# Patient Record
Sex: FEMALE | Race: WHITE | Employment: FULL TIME | URBAN - METROPOLITAN AREA
[De-identification: names, ages, dates, MRNs, and addresses within clinical notes are randomized per-mention and may not be internally consistent; named-entity substitution may affect disease eponyms.]

---

## 2019-07-23 ENCOUNTER — TELEPHONE (OUTPATIENT)
Dept: TRANSPLANT | Facility: CLINIC | Age: 54
End: 2019-07-23

## 2019-07-23 NOTE — TELEPHONE ENCOUNTER
----- Message from Misael Rowland sent at 7/23/2019 12:14 PM CDT -----  Regarding: FW: External Patient Referral    Have medical records that where scanned into media from [facility] . Will call referring MD office if we need any additional information on the pt.      ----- Message -----  From: Katerin Rhoades  Sent: 7/23/2019  10:23 AM  To: [facility] Liver Referral Pool  Subject: External Patient Referral                        Good Morning,    Dr. Boni Terrazas sent a referral for the following patient to receive a consult visit in the liver transplant department. The patient's diagnosis is Elevated liver enzymes.I have scanned the patients referral and records into .       Thank you,   Katerin  [facility]

## 2019-07-30 ENCOUNTER — TELEPHONE (OUTPATIENT)
Dept: TRANSPLANT | Facility: CLINIC | Age: 54
End: 2019-07-30

## 2019-07-30 DIAGNOSIS — K70.30 ALCOHOLIC CIRRHOSIS OF LIVER WITHOUT ASCITES: ICD-10-CM

## 2019-07-30 NOTE — TELEPHONE ENCOUNTER
Referral received from Dr Boni Terrazas.     Patient with alcohol cirrhosis . MELD 22  ICD-10:  CM: K70.30  Referred for liver transplant for CONSULT   Referral completed and forwarded to [facility].          Insurance: Epic

## 2019-08-06 ENCOUNTER — TELEPHONE (OUTPATIENT)
Dept: TRANSPLANT | Facility: CLINIC | Age: 54
End: 2019-08-06

## 2019-08-06 NOTE — TELEPHONE ENCOUNTER
----- Message from Misael Rowland sent at 8/6/2019 12:32 PM CDT -----    Called pt to [facility] an appt, but there was no answer. LVM for her to call back to [facility]

## 2019-08-06 NOTE — TELEPHONE ENCOUNTER
----- Message from Misael Rowland sent at 8/6/2019  4:44 PM CDT -----  Contact: [phone]    Returned call to pt and corby'ed her for 8/8. Will e-mail appt info.    ----- Message -----  From: Jenna Moses MA  Sent: 8/6/2019   3:58 PM  To: [facility] Liver Referral Pool    Patient Returning Call from [facility]    Who Left Message for Patient: Betodoni    Communication Preference:  [phone]    Additional Information: Returning a call re: setting up an appt in Hepatology

## 2019-08-07 DIAGNOSIS — Z76.82 ORGAN TRANSPLANT CANDIDATE: ICD-10-CM

## 2019-08-07 DIAGNOSIS — K70.30 ALCOHOLIC CIRRHOSIS, UNSPECIFIED WHETHER ASCITES PRESENT: Primary | ICD-10-CM

## 2019-08-08 ENCOUNTER — OFFICE VISIT (OUTPATIENT)
Dept: TRANSPLANT | Facility: CLINIC | Age: 54
End: 2019-08-08
Payer: COMMERCIAL

## 2019-08-08 ENCOUNTER — LAB VISIT (OUTPATIENT)
Dept: LAB | Facility: HOSPITAL | Age: 54
End: 2019-08-08
Payer: COMMERCIAL

## 2019-08-08 VITALS
BODY MASS INDEX: 20.44 KG/M2 | HEIGHT: 66 IN | SYSTOLIC BLOOD PRESSURE: 114 MMHG | TEMPERATURE: 98 F | RESPIRATION RATE: 18 BRPM | WEIGHT: 127.19 LBS | HEART RATE: 81 BPM | DIASTOLIC BLOOD PRESSURE: 65 MMHG | OXYGEN SATURATION: 100 %

## 2019-08-08 DIAGNOSIS — K70.30 ALCOHOLIC CIRRHOSIS, UNSPECIFIED WHETHER ASCITES PRESENT: ICD-10-CM

## 2019-08-08 DIAGNOSIS — Z76.82 ORGAN TRANSPLANT CANDIDATE: ICD-10-CM

## 2019-08-08 LAB
ABO + RH BLD: NORMAL
AFP SERPL-MCNC: 5 NG/ML (ref 0–8.4)
ALBUMIN SERPL BCP-MCNC: 3 G/DL (ref 3.5–5.2)
ALP SERPL-CCNC: 146 U/L (ref 55–135)
ALT SERPL W/O P-5'-P-CCNC: 18 U/L (ref 10–44)
AMPHET+METHAMPHET UR QL: NEGATIVE
ANION GAP SERPL CALC-SCNC: 12 MMOL/L (ref 8–16)
AST SERPL-CCNC: 42 U/L (ref 10–40)
BARBITURATES UR QL SCN>200 NG/ML: NEGATIVE
BASOPHILS # BLD AUTO: 0.04 K/UL (ref 0–0.2)
BASOPHILS NFR BLD: 0.9 % (ref 0–1.9)
BENZODIAZ UR QL SCN>200 NG/ML: NEGATIVE
BILIRUB DIRECT SERPL-MCNC: 4.6 MG/DL (ref 0.1–0.3)
BILIRUB SERPL-MCNC: 7.2 MG/DL (ref 0.1–1)
BILIRUB UR QL STRIP: NEGATIVE
BLD GP AB SCN CELLS X3 SERPL QL: NORMAL
BUN SERPL-MCNC: 8 MG/DL (ref 6–20)
BZE UR QL SCN: NEGATIVE
CALCIUM SERPL-MCNC: 8.9 MG/DL (ref 8.7–10.5)
CANNABINOIDS UR QL SCN: NEGATIVE
CHLORIDE SERPL-SCNC: 108 MMOL/L (ref 95–110)
CLARITY UR REFRACT.AUTO: ABNORMAL
CO2 SERPL-SCNC: 19 MMOL/L (ref 23–29)
COLOR UR AUTO: ABNORMAL
CREAT SERPL-MCNC: 0.8 MG/DL (ref 0.5–1.4)
CREAT UR-MCNC: 107 MG/DL (ref 15–325)
DIFFERENTIAL METHOD: ABNORMAL
EOSINOPHIL # BLD AUTO: 0.1 K/UL (ref 0–0.5)
EOSINOPHIL NFR BLD: 3.2 % (ref 0–8)
ERYTHROCYTE [DISTWIDTH] IN BLOOD BY AUTOMATED COUNT: 17.1 % (ref 11.5–14.5)
EST. GFR  (AFRICAN AMERICAN): >60 ML/MIN/1.73 M^2
EST. GFR  (NON AFRICAN AMERICAN): >60 ML/MIN/1.73 M^2
ETHANOL UR-MCNC: <10 MG/DL
GGT SERPL-CCNC: 64 U/L (ref 8–55)
GLUCOSE SERPL-MCNC: 103 MG/DL (ref 70–110)
GLUCOSE UR QL STRIP: NEGATIVE
HCT VFR BLD AUTO: 26.6 % (ref 37–48.5)
HGB BLD-MCNC: 8.7 G/DL (ref 12–16)
HGB UR QL STRIP: NEGATIVE
IMM GRANULOCYTES # BLD AUTO: 0.02 K/UL (ref 0–0.04)
IMM GRANULOCYTES NFR BLD AUTO: 0.5 % (ref 0–0.5)
INR PPP: 1.5 (ref 0.8–1.2)
KETONES UR QL STRIP: NEGATIVE
LEUKOCYTE ESTERASE UR QL STRIP: NEGATIVE
LYMPHOCYTES # BLD AUTO: 0.7 K/UL (ref 1–4.8)
LYMPHOCYTES NFR BLD: 16.4 % (ref 18–48)
MCH RBC QN AUTO: 30.4 PG (ref 27–31)
MCHC RBC AUTO-ENTMCNC: 32.7 G/DL (ref 32–36)
MCV RBC AUTO: 93 FL (ref 82–98)
METHADONE UR QL SCN>300 NG/ML: NEGATIVE
MONOCYTES # BLD AUTO: 0.4 K/UL (ref 0.3–1)
MONOCYTES NFR BLD: 9.7 % (ref 4–15)
NEUTROPHILS # BLD AUTO: 3.1 K/UL (ref 1.8–7.7)
NEUTROPHILS NFR BLD: 69.3 % (ref 38–73)
NITRITE UR QL STRIP: NEGATIVE
NRBC BLD-RTO: 0 /100 WBC
OPIATES UR QL SCN: NEGATIVE
PCP UR QL SCN>25 NG/ML: NEGATIVE
PH UR STRIP: 7 [PH] (ref 5–8)
PLATELET # BLD AUTO: 193 K/UL (ref 150–350)
PMV BLD AUTO: 9 FL (ref 9.2–12.9)
POTASSIUM SERPL-SCNC: 2.7 MMOL/L (ref 3.5–5.1)
PROT SERPL-MCNC: 6.9 G/DL (ref 6–8.4)
PROT UR QL STRIP: NEGATIVE
PROTHROMBIN TIME: 14.6 SEC (ref 9–12.5)
RBC # BLD AUTO: 2.86 M/UL (ref 4–5.4)
SODIUM SERPL-SCNC: 139 MMOL/L (ref 136–145)
SP GR UR STRIP: 1.01 (ref 1–1.03)
TOXICOLOGY INFORMATION: NORMAL
URN SPEC COLLECT METH UR: ABNORMAL
WBC # BLD AUTO: 4.44 K/UL (ref 3.9–12.7)

## 2019-08-08 PROCEDURE — 3008F BODY MASS INDEX DOCD: CPT | Mod: CPTII,S$GLB,TXP, | Performed by: INTERNAL MEDICINE

## 2019-08-08 PROCEDURE — 99205 OFFICE O/P NEW HI 60 MIN: CPT | Mod: S$GLB,TXP,, | Performed by: INTERNAL MEDICINE

## 2019-08-08 PROCEDURE — 82977 ASSAY OF GGT: CPT | Mod: NTX

## 2019-08-08 PROCEDURE — 80321 ALCOHOLS BIOMARKERS 1OR 2: CPT | Mod: NTX

## 2019-08-08 PROCEDURE — 82105 ALPHA-FETOPROTEIN SERUM: CPT | Mod: NTX

## 2019-08-08 PROCEDURE — 99999 PR PBB SHADOW E&M-EST. PATIENT-LVL III: CPT | Mod: PBBFAC,TXP,, | Performed by: INTERNAL MEDICINE

## 2019-08-08 PROCEDURE — 80307 DRUG TEST PRSMV CHEM ANLYZR: CPT | Mod: TXP

## 2019-08-08 PROCEDURE — 3008F PR BODY MASS INDEX (BMI) DOCUMENTED: ICD-10-PCS | Mod: CPTII,S$GLB,TXP, | Performed by: INTERNAL MEDICINE

## 2019-08-08 PROCEDURE — 85025 COMPLETE CBC W/AUTO DIFF WBC: CPT | Mod: TXP

## 2019-08-08 PROCEDURE — 99205 PR OFFICE/OUTPT VISIT, NEW, LEVL V, 60-74 MIN: ICD-10-PCS | Mod: S$GLB,TXP,, | Performed by: INTERNAL MEDICINE

## 2019-08-08 PROCEDURE — 85610 PROTHROMBIN TIME: CPT | Mod: TXP

## 2019-08-08 PROCEDURE — 99999 PR PBB SHADOW E&M-EST. PATIENT-LVL III: ICD-10-PCS | Mod: PBBFAC,TXP,, | Performed by: INTERNAL MEDICINE

## 2019-08-08 PROCEDURE — 81003 URINALYSIS AUTO W/O SCOPE: CPT | Mod: NTX

## 2019-08-08 PROCEDURE — 36415 COLL VENOUS BLD VENIPUNCTURE: CPT | Mod: TXP

## 2019-08-08 PROCEDURE — 82248 BILIRUBIN DIRECT: CPT | Mod: TXP

## 2019-08-08 PROCEDURE — 86850 RBC ANTIBODY SCREEN: CPT | Mod: TXP

## 2019-08-08 PROCEDURE — 80053 COMPREHEN METABOLIC PANEL: CPT | Mod: TXP

## 2019-08-08 RX ORDER — ACAMPROSATE CALCIUM 333 MG/1
666 TABLET, DELAYED RELEASE ORAL 3 TIMES DAILY
COMMUNITY
End: 2019-10-29 | Stop reason: ALTCHOICE

## 2019-08-08 NOTE — PROGRESS NOTES
Transplant Hepatology  Liver Transplant Recipient Evaluation      Consultation started: 8/8/2019 at 11:09 AM     Original Referring Provider: Boni Terrazas  Current Corresponding Physician: Boni LOZANO Native Liver Diagnosis: Alcoholic Cirrhosis    Reason for Visit: evaluation for liver transplant     Subjective:     Porsha Underwood is a 53 y.o. 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  Accompanied by  and teenage daughter.    The patient reports no known history of chronic liver disease and family confirms.  She has not regularly followed with a primary care provider or had recent PE/lab work.  States that approximately one month ago she noted jaundice and abdominal distention which prompted her to see a physician.  She was told that she had liver failure and was hospitalized.  Reports HE with AMS which improved with lactulose administration.  Also placed on rifaximin.  Reports that mental status improved to baseline.  There was no evidence of alcohol withdrawal and the patient reports that she has never had withdrawal symptoms.  Liver disease was complicated by ascites, HE and jaundice.  She did not experience GI bleeding.  Reports 8 day hospitalization.    Discharged on acamprosate and rifaximin which she continues.  Reports improving symptoms with no further evidence of HE and volume improving without diuretic therapy although she is not following a low sodium diet.  She has had no alcohol intake since discharge.    The patient reports a history of drinking approximately one pint of bourbon or vodka most days of the week.  She denies any prior health, legal or relationship issues related to alcohol consumption.  She stopped drinking 6-8 weeks ago, prior to hospitalization but reports no particular precipitant for cessation.  She has returned to work as a .      PMH:  Alcoholic liver disease     PSH:    Hysterectomy - 10 years ago for benign etiology    FH: mother  with cirrhosis (alcohol)    SH:    and lives with  and daughter  5 children between marriage  Works as a , patient has returned to work  Smoke 1/2ppd  Alcohol - bourbon or vodka (1 pint most days of the week)  No alcohol related issues   Last drink 6-8 weeks   No illicit drugs     Review of Systems   Constitutional: Positive for fatigue. Negative for activity change, appetite change, chills, fever and unexpected weight change.   HENT: Negative for hearing loss, rhinorrhea and trouble swallowing.    Eyes: Negative for visual disturbance.   Respiratory: Negative for shortness of breath.    Cardiovascular: Negative for chest pain.   Gastrointestinal: Positive for abdominal distention. Negative for abdominal pain (mild), nausea and vomiting.   Endocrine: Negative for cold intolerance and heat intolerance.   Musculoskeletal: Negative for gait problem.   Skin: Negative for rash.   Neurological: Negative for weakness and headaches.   Hematological: Negative for adenopathy. Does not bruise/bleed easily.   Psychiatric/Behavioral: Negative for confusion and decreased concentration.       Objective:   Physical Exam   Constitutional: She is oriented to person, place, and time. She appears well-developed and well-nourished. No distress.   jaundice   HENT:   Head: Normocephalic and atraumatic.   Mouth/Throat: Oropharynx is clear and moist. No oropharyngeal exudate.   Eyes: Pupils are equal, round, and reactive to light. EOM are normal. Scleral icterus is present.   Neck: Normal range of motion. Neck supple. No thyromegaly present.   Cardiovascular: Normal rate, regular rhythm and normal heart sounds. Exam reveals no gallop and no friction rub.   No murmur heard.  Pulmonary/Chest: Effort normal. No respiratory distress. She has no wheezes. She has no rales.   Abdominal: Soft. Bowel sounds are normal. She exhibits distension. There is no tenderness.   Musculoskeletal: Normal range of motion. She exhibits  no edema.   Lymphadenopathy:     She has no cervical adenopathy.   Neurological: She is alert and oriented to person, place, and time. No cranial nerve deficit.   Mental status normal, no asterixis   Skin: Skin is warm and dry. No rash noted.   Psychiatric: She has a normal mood and affect. Her behavior is normal.   Vitals reviewed.       MELD-Na score: 18 at 8/8/2019  9:40 AM  MELD score: 18 at 8/8/2019  9:40 AM  Calculated from:  Serum Creatinine: 0.8 mg/dL (Rounded to 1 mg/dL) at 8/8/2019  9:40 AM  Serum Sodium: 139 mmol/L (Rounded to 137 mmol/L) at 8/8/2019  9:40 AM  Total Bilirubin: 7.2 mg/dL at 8/8/2019  9:40 AM  INR(ratio): 1.5 at 8/8/2019  9:40 AM  Age: 53 years     Lab Results   Component Value Date    WBC 4.44 08/08/2019    RBC 2.86 (L) 08/08/2019    HGB 8.7 (L) 08/08/2019    HCT 26.6 (L) 08/08/2019     08/08/2019     Lab Results   Component Value Date    LABPROT 14.6 (H) 08/08/2019    INR 1.5 (H) 08/08/2019       Diagnostics: EMR and external labs reviewed    1. Alcoholic cirrhosis, unspecified whether ascites present    2. Organ transplant candidate        Transplant Hepatology - Candidacy   Assessment/Plan:     Transplant Candidacy: Porsha Underwood is a 53 y.o. female with ESLD secondary to alcohol abuse here for evaluation for possible OLT.  In my opinion,it is unclear if she is a good candidate for liver transplant.      The patient had recent presentation with alcoholic hepatitis which appears to be improving.  MELD score at referral was 22 and currently 18.  Although it is > 14, given the patient's recent alcohol abstinence there is potential for hepatic recovery and declining MELD score.  Therefore will not open transplant evaluation currently.  Recommend repeat labs in 1 week to follow hypokalemia.  Would also add magnesium given risk for vitamin deficiencies in the setting of chronic alcohol use.  We will monitor labs closely and have patient return in 6-8 weeks with addiction psychiatry  consultation for same day.  Based on her medical recovery and addiction psychiatry assessment, we will determine need for transplant at this time.    If the patient has interval decompensation despite alcohol abstinence, would proceed with full evaluation given lack of history of chronic liver disease and expression of commitment for lifelong abstinence with good social support.    Alcohol abuse:  Will continue acamprosate until assessment by psychiatry    HE: will continue rifaximin, not currently on lactulose but given lack of symptoms currently, will not add as additional bowel movements may be difficult with current employment    Hypokalemia:  40mEq potassium chloride x 2 and recheck next week    RTC in 6-8 weeks     Miguelina Salas MD         OS Patient Status  Functional Status: 80% - Normal activity with effort: some symptoms of disease  Physical Capacity: No Limitations    Outside Records Request: none

## 2019-08-08 NOTE — PATIENT INSTRUCTIONS
We will check labs locally in 2 weeks.    No transplant evaluation at this time.    It is important to continue to completely avoid alcohol.    Continue acamprosate and rifaximin.    No fluid pills at this time.  It is important to follow a low sodium diet.    Return to clinic in 2-3 months with addiction psychiatry for same day.

## 2019-08-08 NOTE — LETTER
August 9, 2019        Boni Terrazas  [address]  [place] [address]  Phone: [phone]  Fax: [phone]             [facility]  [address]  [facility] [address]  Phone: [phone]   Patient: Porsha Underwood   MR Number: [id number]   YOB: 1965   Date of Visit: 8/8/2019       Dear Dr. Boni Terrazas    Thank you for referring Porsha Underwood to me for evaluation. Attached you will find relevant portions of my assessment and plan of care.    If you have questions, please do not hesitate to call me. I look forward to following Porsha Underwood along with you.    Sincerely,    Miguelina Salas MD    Enclosure    If you would like to receive this communication electronically, please contact [email or web address] or [phone] to request [email or web address] Link access.    [email or web address] Link is a tool which provides read-only access to select patient information with whom you have a relationship. Its easy to use and provides real time access to review your patients record including encounter summaries, notes, results, and demographic information.    If you feel you have received this communication in error or would no longer like to receive these types of communications, please e-mail [email or web address]

## 2019-08-09 ENCOUNTER — DOCUMENTATION ONLY (OUTPATIENT)
Dept: TRANSPLANT | Facility: CLINIC | Age: 54
End: 2019-08-09

## 2019-08-09 DIAGNOSIS — K70.30 ALCOHOLIC CIRRHOSIS OF LIVER WITHOUT ASCITES: Primary | ICD-10-CM

## 2019-08-09 DIAGNOSIS — Z76.82 ORGAN TRANSPLANT CANDIDATE: ICD-10-CM

## 2019-08-09 NOTE — PROGRESS NOTES
Appt card made for Pt to RTC with Dr. Salas in 6-8 weeks with addiction psych. Pt will have repeat K+ and Mag 8/15/2019.

## 2019-08-12 ENCOUNTER — TELEPHONE (OUTPATIENT)
Dept: TRANSPLANT | Facility: CLINIC | Age: 54
End: 2019-08-12

## 2019-08-12 DIAGNOSIS — E87.6 HYPOKALEMIA: Primary | ICD-10-CM

## 2019-08-12 RX ORDER — POTASSIUM CHLORIDE 20 MEQ/1
40 TABLET, EXTENDED RELEASE ORAL 2 TIMES DAILY
Status: CANCELLED | OUTPATIENT
Start: 2019-08-12

## 2019-08-12 RX ORDER — POTASSIUM CHLORIDE 20 MEQ/1
40 TABLET, EXTENDED RELEASE ORAL ONCE
Qty: 6 TABLET | Refills: 0 | Status: SHIPPED | OUTPATIENT
Start: 2019-08-12 | End: 2019-08-12

## 2019-08-12 NOTE — TELEPHONE ENCOUNTER
Discussed taking potassium medication and repeating labs Thursday at [facility] lab order sent. Pt states understanding.

## 2019-08-12 NOTE — TELEPHONE ENCOUNTER
----- Message from Miguelina Salas MD sent at 8/9/2019  9:18 AM CDT -----  Please have patient take potassium chloride 40mEq x 2 at least 4 hours apart.  Repeat labs locally in 1 week

## 2019-08-12 NOTE — TELEPHONE ENCOUNTER
Called Pt to discuss K+ level and to start taking Potassium with repeating labs by the end of the week. Message left on V/M. To return the call.

## 2019-08-13 ENCOUNTER — TELEPHONE (OUTPATIENT)
Dept: TRANSPLANT | Facility: CLINIC | Age: 54
End: 2019-08-13

## 2019-08-13 LAB — PHOSPHATIDYLETHANOL (PETH): NEGATIVE NG/ML

## 2019-08-13 NOTE — TELEPHONE ENCOUNTER
Pt called stated she took potassium medication yesterday and will repeat labs on Thursday at [facility]

## 2019-08-16 ENCOUNTER — DOCUMENTATION ONLY (OUTPATIENT)
Dept: TRANSPLANT | Facility: CLINIC | Age: 54
End: 2019-08-16

## 2019-08-16 ENCOUNTER — TELEPHONE (OUTPATIENT)
Dept: TRANSPLANT | Facility: CLINIC | Age: 54
End: 2019-08-16

## 2019-08-16 LAB
EXT BILIRUBIN TOTAL: 4.8 (ref 0.2–1)
EXT BUN: 8 (ref 7–18)
EXT CREATININE: 0.76 MG/DL (ref 0.6–1.3)
EXT GLUCOSE: 62 (ref 74–106)
EXT HEMATOCRIT: 27.7 (ref 34–44)
EXT HEMOGLOBIN: 9.1 (ref 12–14)
EXT INR: 1.83 (ref 0.84–1.15)
EXT PLATELETS: 162 (ref 130–400)
EXT POTASSIUM: 2.97 (ref 3.5–5.1)
EXT PT: 20.9 (ref 9.5–13.1)
EXT SODIUM: 137 MMOL/L (ref 136–145)
EXT WBC: 4.4 (ref 4–10)

## 2019-08-16 NOTE — TELEPHONE ENCOUNTER
Called [facility] for labs results. Spoke to Jose in the lab, Pt information taken to fax results. [phone]

## 2019-08-21 ENCOUNTER — TELEPHONE (OUTPATIENT)
Dept: TRANSPLANT | Facility: CLINIC | Age: 54
End: 2019-08-21

## 2019-08-27 RX ORDER — POTASSIUM CHLORIDE 20 MEQ/1
20 TABLET, EXTENDED RELEASE ORAL DAILY
Qty: 30 TABLET | Refills: 1 | Status: SHIPPED | OUTPATIENT
Start: 2019-08-27

## 2019-09-12 ENCOUNTER — DOCUMENTATION ONLY (OUTPATIENT)
Dept: TRANSPLANT | Facility: CLINIC | Age: 54
End: 2019-09-12

## 2019-09-12 LAB
EXT AST: 36 (ref 15–37)
EXT BILIRUBIN TOTAL: 3.8 (ref 0.2–1)
EXT BUN: 7 (ref 7–18)
EXT CALCIUM: 8.5 (ref 8.5–10.1)
EXT CHLORIDE: 110 (ref 98–107)
EXT CO2: 21 (ref 21–32)
EXT CREATININE: 0.79 MG/DL (ref 0.6–1.3)
EXT GFR MDRD NON AF AMER: 99
EXT GLUCOSE: 110 (ref 74–106)
EXT HEMATOCRIT: 30.6 (ref 34–44)
EXT HEMOGLOBIN: 9.7 (ref 12–14)
EXT INR: 1.73 (ref 0.84–1.15)
EXT PLATELETS: 183 (ref 130–400)
EXT POTASSIUM: 3.3 (ref 3.5–5.1)
EXT PT: 19.8 (ref 9.5–13.1)
EXT SODIUM: 138 MMOL/L (ref 136–145)
EXT WBC: 3.5 (ref 4–10)

## 2019-09-26 ENCOUNTER — OFFICE VISIT (OUTPATIENT)
Dept: PSYCHIATRY | Facility: CLINIC | Age: 54
End: 2019-09-26
Payer: COMMERCIAL

## 2019-09-26 VITALS
BODY MASS INDEX: 21.17 KG/M2 | SYSTOLIC BLOOD PRESSURE: 92 MMHG | DIASTOLIC BLOOD PRESSURE: 53 MMHG | HEIGHT: 66 IN | HEART RATE: 98 BPM | WEIGHT: 131.75 LBS

## 2019-09-26 DIAGNOSIS — Z78.9 ALCOHOL USE: Primary | ICD-10-CM

## 2019-09-26 PROCEDURE — 99999 PR PBB SHADOW E&M-EST. PATIENT-LVL II: ICD-10-PCS | Mod: PBBFAC,TXP,, | Performed by: PSYCHIATRY & NEUROLOGY

## 2019-09-26 PROCEDURE — 90792 PSYCH DIAG EVAL W/MED SRVCS: CPT | Mod: NTX,S$GLB,, | Performed by: PSYCHIATRY & NEUROLOGY

## 2019-09-26 PROCEDURE — 99999 PR PBB SHADOW E&M-EST. PATIENT-LVL II: CPT | Mod: PBBFAC,TXP,, | Performed by: PSYCHIATRY & NEUROLOGY

## 2019-09-26 PROCEDURE — 90792 PR PSYCHIATRIC DIAGNOSTIC EVALUATION W/MEDICAL SERVICES: ICD-10-PCS | Mod: NTX,S$GLB,, | Performed by: PSYCHIATRY & NEUROLOGY

## 2019-09-26 NOTE — PROGRESS NOTES
"PSYCHIATRIC EVALUATION REGARDING  POSSIBLE LIVER TRANSPLANT    ENCOUNTER DATE: 9/26/2019  SITE: [facility], [facility]  REFFERAL SOURCE: transplant service  Met with: patient    HISTORY    CHIEF COMPLAINT   Porsha Underwood is a 53 y.o. female who presents to the clinic for a psychiatric evaluation with the chief complaint of: evaluation for possible liver transplant    HPI     From transplant MD note from 08/09/19:  "The patient reports no known history of chronic liver disease and family confirms.  She has not regularly followed with a primary care provider or had recent PE/lab work.  States that approximately one month ago she noted jaundice and abdominal distention which prompted her to see a physician.  She was told that she had liver failure and was hospitalized.  Reports HE with AMS which improved with lactulose administration.  Also placed on rifaximin.  Reports that mental status improved to baseline.  There was no evidence of alcohol withdrawal and the patient reports that she has never had withdrawal symptoms.  Liver disease was complicated by ascites, HE and jaundice.  She did not experience GI bleeding.  Reports 8 day hospitalization.  Discharged on acamprosate and rifaximin which she continues.  Reports improving symptoms with no further evidence of HE and volume improving without diuretic therapy although she is not following a low sodium diet.  She has had no alcohol intake since discharge.  The patient reports a history of drinking approximately one pint of bourbon or vodka most days of the week.  She denies any prior health, legal or relationship issues related to alcohol consumption.  She stopped drinking 6-8 weeks ago, prior to hospitalization but reports no particular precipitant for cessation.  She has returned to work as a ."      Pt. Today reports, that she has accepted, that she has a liver problem, states, that she is currently coping well with this, is sleeping " reasonably well, appetite is good, libido is good, concentration.  Most recent drink of alcohol in mid-late June. Had been drinking 5-7 drinks per night on week nights, more on weekends. Began drinking possibly around age 18.  Stopped taking Acamprosate in late July.  No drug. No rehab.     Gil present during latter part of today's visit. Reports, that wife is doing well. Gil trusts his wife not to drink alcohol.    Psychiatric Review Of Systems - Is patient experiencing or having changes in:  sleep: adequate  appetite: no  weight: no  energy/anergy: no  interest/pleasure/anhedonia: no  somatic symptoms: no  libido: no  anxiety/panic: no  guilty/hopelessness: no  concentration: no  S.I.B.s/risky behavior: no  Irritability: no  Racing thoughts: no  Impulsive behaviors: no  Paranoia:no  AVH:no    PAST PSYCHIATRIC HISTORY  Never saw psychiatrist.  Previous Psychiatric Hospitalizations: no   Previous Suicide Attempts: no   Previous Medication Trials: no  Psychiatric Care: no  History of Violence: no  Depression: no  Beatriz: no  AH's: no  Delusions: no    Medical ROS    General ROS: reports some left ankle swelling  ENT ROS: negative  Cardiovascular ROS: negative  Respiratory ROS: negative  Gastrointestinal ROS: negative  Neurological ROS: negative  Dermatological ROS: negative  Endocrine ROS: negative    NUTRITIONAL SCREENING   Considering the patient's height and weight, medications, medical history and preferences, should a referral be made to the dietitian? no    PAST MEDICAL HISTORY   Cirrhosis of the liver  Wish to become pregnant[if female of childbearing age]: s/p hysterectomy in 2010    NEUROLOGIC HISTORY   Seizures: no   Head trauma/Loss of consciousness: no head trauma with l.o.c.    ALLERGIES   Review of patient's allergies indicates:   Allergen Reactions    Penicillins Hives       MEDICATIONS   Psychotropic Medications   no    Scheduled and PRN Medications     Current Outpatient Medications:      "acamprosate (CAMPRAL) 333 mg tablet, Take 666 mg by mouth 3 (three) times daily., Disp: , Rfl:     potassium chloride SA (K-DUR,KLOR-CON) 20 MEQ tablet, Take 1 tablet (20 mEq total) by mouth once daily., Disp: 30 tablet, Rfl: 1    rifAXIMin (XIFAXAN) 550 mg Tab, Take 550 mg by mouth 2 (two) times daily., Disp: , Rfl:     SUBSTANCE ABUSE HISTORY   Tobacco: smokes about half a pack per day    LEGAL HISTORY   Past Charges/Incarcerations: no   Pending Charges: no    FAMILY PSYCHIATRIC HISTORY   Mother alcoholic, increased later in life  Father alcoholic, quit in his 40's     SOCIAL HISTORY  History of Physical/Sexual Abuse: no  Education: associates degree    Employment/Disability:   Financial: some difficulty  Relationship Status/Sexual Orientation:  to second    Children: 3 adult children   Housing Status: lives with  and niece  Spirituality: has spiritual beliefs   History: no   Recreational Activities: reading, fishing  Access to Gun: yes     EXAMINATION    VITALS   Wt Readings from Last 3 Encounters:   09/26/19 59.8 kg (131 lb 11.6 oz)   08/08/19 57.7 kg (127 lb 3.3 oz)     Temp Readings from Last 3 Encounters:   08/08/19 98.3 °F (36.8 °C) (Oral)     BP Readings from Last 3 Encounters:   09/26/19 (!) 92/53   08/08/19 114/65     Pulse Readings from Last 3 Encounters:   09/26/19 98   08/08/19 81       CONSTITUTIONAL  General Appearance: unremarkable, age appropriate    MUSCULOSKELETAL  Muscle Strength and Tone: normal strength and tone  Abnormal Involuntary Movements: none noted  Gait and Station: normal gait and station    PSYCHIATRIC   Level of Consciousness: alert  Orientation: oriented to person, place and time  Grooming: well groomed  Psychomotor Behavior: no agitation  Speech: normal in rate, rhythm and volume  Language: uses words appropriately  Mood: "fine"  Affect: full range, appropriate  Thought Process: logical, goal directed  Associations: intact  Thought " Content: no SI/HI  Memory: grossly intact  Attention: intact for interview  Insight: appears adequate  Judgement: appears adequate    ASSESSMENT     DIAGNOSIS/IMPRESSION   Alcohol Use[pt. Has quit]    Pt. Currently appears psychiatrically adequately stable for possible liver transplant.  Pt. Currently prefers to remain off Acamprosate. This appears reasonable.    Time with patient:   More than 50% of the time was spent counseling/coordinating care.  LABORATORY DATA  Documentation Only on 09/12/2019   Component Date Value Ref Range Status    EXT WBC 09/09/2019 3.5* 4.0 - 10.0 Final    EXT Hematocrit 09/09/2019 30.6* 34.0 - 44.0 Final    EXT Hemoglobin 09/09/2019 9.7* 12.0 - 14.0 Final    EXT Platelets 09/09/2019 183  130 - 400 Final    EXT PT 09/09/2019 19.8* 9.5 - 13.1 Final    EXT INR 09/09/2019 1.73* 0.84 - 1.15 Final    EXT Glucose 09/09/2019 110* 74 - 106 Final    EXT BUN 09/09/2019 7.0  7.0 - 18.0 Final    EXT Creatinine 09/09/2019 0.79  0.60 - 1.30 mg/dL Final    EXT Calcium 09/09/2019 8.5  8.5 - 10.1 Final    EXT Sodium 09/09/2019 138  136 - 145 mmol/L Final    EXT Potassium 09/09/2019 3.30* 3.50 - 5.10 Final    EXT CO2 09/09/2019 21  21 - 32 Final    EXT Chloride 09/09/2019 110* 98 - 107 Final    EXT BilirubiN Total 09/09/2019 3.8* 0.2 - 1.0 Final    EXT AST 09/09/2019 36  15 - 37 Final    EXT GFR MDRD NON AF AMER 09/09/2019 99   Final   Documentation Only on 08/16/2019   Component Date Value Ref Range Status    EXT WBC 08/15/2019 4.4  4.0 - 10 Final    EXT Hematocrit 08/15/2019 27.7* 34 - 44 Final    EXT Hemoglobin 08/15/2019 9.1* 12 - 14 Final    EXT Platelets 08/15/2019 162  130 - 400 Final    EXT PT 08/15/2019 20.9* 9.5 - 13.1 Final    EXT INR 08/15/2019 1.83* 0.84 - 1.15 Final    EXT Glucose 08/15/2019 62* 74 - 106 Final    EXT BUN 08/15/2019 8.0  7 - 18 Final    EXT Creatinine 08/15/2019 0.76  0.60 - 1.30 mg/dL Final    EXT Sodium 08/15/2019 137  136 - 145 mmol/L Final     EXT Potassium 08/15/2019 2.97* 3.5 - 5.10 Final    EXT BilirubiN Total 08/15/2019 4.8* 0.2 - 1.0 Final   Office Visit on 08/08/2019   Component Date Value Ref Range Status    Alcohol, Urine 08/08/2019 <10  <10 mg/dL Final    Benzodiazepines 08/08/2019 Negative   Final    Methadone metabolites 08/08/2019 Negative   Final    Cocaine (Metab.) 08/08/2019 Negative   Final    Opiate Scrn, Ur 08/08/2019 Negative   Final    Barbiturate Screen, Ur 08/08/2019 Negative   Final    Amphetamine Screen, Ur 08/08/2019 Negative   Final    THC 08/08/2019 Negative   Final    Phencyclidine 08/08/2019 Negative   Final    Creatinine, Random Ur 08/08/2019 107.0  15.0 - 325.0 mg/dL Final    Comment: The random urine reference ranges provided were established   for 24 hour urine collections.  No reference ranges exist for  random urine specimens.  Correlate clinically.      Toxicology Information 08/08/2019 SEE COMMENT   Final    Comment: This screen includes the following classes of drugs at the   listed cut-off:  Benzodiazepines                  200 ng/ml  Methadone                        300 ng/ml  Cocaine metabolite               300 ng/ml  Opiates                          300 ng/ml  Barbiturates                     200 ng/ml  Amphetamines                    1000 ng/ml  Marijuana metabs (THC)            50 ng/ml  Phencyclidine (PCP)               25 ng/ml  High concentrations of Diphenhydramine may cross-react with  Phencyclidine PCP screening immunoassay giving a false   positive result.  High concentrations of Methylenedioxymethamphetamine (MDMA aka  Ectasy) and other structurally similar compounds may cross-   react with the Amphetamine/Methamphetamine screening   immunoassay giving a false positive result.  A metabolite of the anti-HIV drug Sustiva () may cause  false positive results in the Marijuana metabolite (THC)   screening assay.  Note: This exception list includes only more common   interferants i                            n toxicology screen testing.  Because of many   cross-reactantspositive results on toxicology drug screens   should be confirmed whenever results do not correlate with   clinical presentation.  This report is intended for use in clinical monitoring and  management of patients. It is not intended for use in   employment related drug testing.  Because of any cross-reactants, positive results on toxicology  drug screens should be confirmed whenever results do not  correlate with clinical presentation.  Presumptive positive results are unconfirmed and may be used   only for medical purposes.  Assay Intended Use: This asasy provides only a preliminary analytical  test result. A more specific alternate chemical method must be used  to obtain a confirmed analytical result. Gas chromatography/mass  spectrometry (GS/MS)is the preferred confirmatory method. Clinical  consideration and professional judgement should be applied to any   drug of abuse test result, particularly when preliminary resul                           ts  are used.      Specimen UA 08/08/2019 Urine, Clean Catch   Final    Color, UA 08/08/2019 Alissa  Yellow, Straw, Alissa Final    Appearance, UA 08/08/2019 Hazy* Clear Final    pH, UA 08/08/2019 7.0  5.0 - 8.0 Final    Specific Gravity, UA 08/08/2019 1.010  1.005 - 1.030 Final    Protein, UA 08/08/2019 Negative  Negative Final    Comment: Recommend a 24 hour urine protein or a urine   protein/creatinine ratio if globulin induced proteinuria is  clinically suspected.      Glucose, UA 08/08/2019 Negative  Negative Final    Ketones, UA 08/08/2019 Negative  Negative Final    Bilirubin (UA) 08/08/2019 Negative  Negative Final    Occult Blood UA 08/08/2019 Negative  Negative Final    Nitrite, UA 08/08/2019 Negative  Negative Final    Leukocytes, UA 08/08/2019 Negative  Negative Final   Lab Visit on 08/08/2019   Component Date Value Ref Range Status    Sodium 08/08/2019 139  136 - 145  mmol/L Final    Potassium 08/08/2019 2.7* 3.5 - 5.1 mmol/L Final    Comment: *Critical value -  Results called to and read back by:BERTRAND GILBERT RN      Chloride 08/08/2019 108  95 - 110 mmol/L Final    CO2 08/08/2019 19* 23 - 29 mmol/L Final    Glucose 08/08/2019 103  70 - 110 mg/dL Final    BUN, Bld 08/08/2019 8  6 - 20 mg/dL Final    Creatinine 08/08/2019 0.8  0.5 - 1.4 mg/dL Final    Calcium 08/08/2019 8.9  8.7 - 10.5 mg/dL Final    Total Protein 08/08/2019 6.9  6.0 - 8.4 g/dL Final    Albumin 08/08/2019 3.0* 3.5 - 5.2 g/dL Final    Total Bilirubin 08/08/2019 7.2* 0.1 - 1.0 mg/dL Final    Comment: For infants and newborns, interpretation of results should be based  on gestational age, weight and in agreement with clinical  observations.  Premature Infant recommended reference ranges:  Up to 24 hours.............<8.0 mg/dL  Up to 48 hours............<12.0 mg/dL  3-5 days..................<15.0 mg/dL  6-29 days.................<15.0 mg/dL      Alkaline Phosphatase 08/08/2019 146* 55 - 135 U/L Final    AST 08/08/2019 42* 10 - 40 U/L Final    ALT 08/08/2019 18  10 - 44 U/L Final    Anion Gap 08/08/2019 12  8 - 16 mmol/L Final    eGFR if African American 08/08/2019 >60.0  >60 mL/min/1.73 m^2 Final    eGFR if non African American 08/08/2019 >60.0  >60 mL/min/1.73 m^2 Final    Comment: Calculation used to obtain the estimated glomerular filtration  rate (eGFR) is the CKD-EPI equation.       GGT 08/08/2019 64* 8 - 55 U/L Final    Bilirubin, Direct 08/08/2019 4.6* 0.1 - 0.3 mg/dL Final    Group & Rh 08/08/2019 O POS   Final    Indirect Nancy 08/08/2019 NEG   Final    WBC 08/08/2019 4.44  3.90 - 12.70 K/uL Final    RBC 08/08/2019 2.86* 4.00 - 5.40 M/uL Final    Hemoglobin 08/08/2019 8.7* 12.0 - 16.0 g/dL Final    Hematocrit 08/08/2019 26.6* 37.0 - 48.5 % Final    Mean Corpuscular Volume 08/08/2019 93  82 - 98 fL Final    Mean Corpuscular Hemoglobin 08/08/2019 30.4  27.0 - 31.0 pg Final     Mean Corpuscular Hemoglobin Conc 08/08/2019 32.7  32.0 - 36.0 g/dL Final    RDW 08/08/2019 17.1* 11.5 - 14.5 % Final    Platelets 08/08/2019 193  150 - 350 K/uL Final    MPV 08/08/2019 9.0* 9.2 - 12.9 fL Final    Immature Granulocytes 08/08/2019 0.5  0.0 - 0.5 % Final    Gran # (ANC) 08/08/2019 3.1  1.8 - 7.7 K/uL Final    Immature Grans (Abs) 08/08/2019 0.02  0.00 - 0.04 K/uL Final    Comment: Mild elevation in immature granulocytes is non specific and   can be seen in a variety of conditions including stress response,   acute inflammation, trauma and pregnancy. Correlation with other   laboratory and clinical findings is essential.      Lymph # 08/08/2019 0.7* 1.0 - 4.8 K/uL Final    Mono # 08/08/2019 0.4  0.3 - 1.0 K/uL Final    Eos # 08/08/2019 0.1  0.0 - 0.5 K/uL Final    Baso # 08/08/2019 0.04  0.00 - 0.20 K/uL Final    nRBC 08/08/2019 0  0 /100 WBC Final    Gran% 08/08/2019 69.3  38.0 - 73.0 % Final    Lymph% 08/08/2019 16.4* 18.0 - 48.0 % Final    Mono% 08/08/2019 9.7  4.0 - 15.0 % Final    Eosinophil% 08/08/2019 3.2  0.0 - 8.0 % Final    Basophil% 08/08/2019 0.9  0.0 - 1.9 % Final    Differential Method 08/08/2019 Automated   Final    Prothrombin Time 08/08/2019 14.6* 9.0 - 12.5 sec Final    INR 08/08/2019 1.5* 0.8 - 1.2 Final    Comment: Coumadin Therapy:  2.0 - 3.0 for INR for all indicators except mechanical heart valves  and antiphospholipid syndromes which should use 2.5 - 3.5.      Phosphatidylethanol 08/08/2019 Negative  Negative ng/mL Final    Comment: Analyzed compound: PEth 16:0/18:1  1-palmitoyl-2-sizbbo-xc-mvcgodt-3-phosphoethanol.  Analysis performed by Liquid Chromatography with Tandem Mass  Spectrometry (LC/MS/MS).  Detection limit 20ng/ml  PEth levels in excess of 20ng/ml are considered evidence of   moderate to heavy ethanol consumption.  However,the Center for   Substance Abuse Treatment (CSAT) advises caution in interpretation  and use of biomarkers alone to assess  "alcohol use. Results should be   interpreted in the contest of all available clinical and behavioral   information.   Reference: Substance Abuse and Mental Health Servies Administration  (2012). "The Role of Biomarkers in the Treatment of Alcohol Use    Disorders", 2012 Revision. Advisory, Volume 11,Issue 2.  This test was developed and its performance characteristics determined  by [employer].  It has not been cleared or approved by the Food and Drug  Administration.  *Performing location:  [facility], [facility].  [address] [place] [address] [id number] [place] [address] 08/08/2019 5.0  0.0 - 8.4 ng/mL Final         MEDICATIONS  Outpatient Encounter Medications as of 9/26/2019   Medication Sig Dispense Refill    acamprosate (CAMPRAL) 333 mg tablet Take 666 mg by mouth 3 (three) times daily.      potassium chloride SA (K-DUR,KLOR-CON) 20 MEQ tablet Take 1 tablet (20 mEq total) by mouth once daily. 30 tablet 1    rifAXIMin (XIFAXAN) 550 mg Tab Take 550 mg by mouth 2 (two) times daily.       No facility-administered encounter medications on file as of 9/26/2019.            Jhonatan Godwin      "

## 2019-10-29 ENCOUNTER — OFFICE VISIT (OUTPATIENT)
Dept: TRANSPLANT | Facility: CLINIC | Age: 54
End: 2019-10-29
Payer: COMMERCIAL

## 2019-10-29 ENCOUNTER — LAB VISIT (OUTPATIENT)
Dept: LAB | Facility: HOSPITAL | Age: 54
End: 2019-10-29
Attending: INTERNAL MEDICINE
Payer: COMMERCIAL

## 2019-10-29 VITALS
RESPIRATION RATE: 18 BRPM | HEIGHT: 67 IN | WEIGHT: 135.56 LBS | HEART RATE: 80 BPM | DIASTOLIC BLOOD PRESSURE: 65 MMHG | SYSTOLIC BLOOD PRESSURE: 108 MMHG | BODY MASS INDEX: 21.28 KG/M2 | TEMPERATURE: 98 F | OXYGEN SATURATION: 100 %

## 2019-10-29 DIAGNOSIS — K70.30 ALCOHOLIC CIRRHOSIS OF LIVER WITHOUT ASCITES: ICD-10-CM

## 2019-10-29 DIAGNOSIS — K70.9 ALCOHOLIC LIVER DISEASE: Primary | ICD-10-CM

## 2019-10-29 DIAGNOSIS — Z76.82 ORGAN TRANSPLANT CANDIDATE: ICD-10-CM

## 2019-10-29 LAB
ALBUMIN SERPL BCP-MCNC: 2.9 G/DL (ref 3.5–5.2)
ALP SERPL-CCNC: 200 U/L (ref 55–135)
ALT SERPL W/O P-5'-P-CCNC: 16 U/L (ref 10–44)
ANION GAP SERPL CALC-SCNC: 5 MMOL/L (ref 8–16)
AST SERPL-CCNC: 38 U/L (ref 10–40)
BASOPHILS # BLD AUTO: 0.05 K/UL (ref 0–0.2)
BASOPHILS NFR BLD: 1.3 % (ref 0–1.9)
BILIRUB SERPL-MCNC: 2.6 MG/DL (ref 0.1–1)
BUN SERPL-MCNC: 7 MG/DL (ref 6–20)
CALCIUM SERPL-MCNC: 8.8 MG/DL (ref 8.7–10.5)
CHLORIDE SERPL-SCNC: 108 MMOL/L (ref 95–110)
CO2 SERPL-SCNC: 25 MMOL/L (ref 23–29)
CREAT SERPL-MCNC: 0.7 MG/DL (ref 0.5–1.4)
DIFFERENTIAL METHOD: ABNORMAL
EOSINOPHIL # BLD AUTO: 0.2 K/UL (ref 0–0.5)
EOSINOPHIL NFR BLD: 5.4 % (ref 0–8)
ERYTHROCYTE [DISTWIDTH] IN BLOOD BY AUTOMATED COUNT: 14.4 % (ref 11.5–14.5)
EST. GFR  (AFRICAN AMERICAN): >60 ML/MIN/1.73 M^2
EST. GFR  (NON AFRICAN AMERICAN): >60 ML/MIN/1.73 M^2
GLUCOSE SERPL-MCNC: 77 MG/DL (ref 70–110)
HCT VFR BLD AUTO: 31 % (ref 37–48.5)
HGB BLD-MCNC: 9.5 G/DL (ref 12–16)
IMM GRANULOCYTES # BLD AUTO: 0.01 K/UL (ref 0–0.04)
IMM GRANULOCYTES NFR BLD AUTO: 0.3 % (ref 0–0.5)
INR PPP: 1.5 (ref 0.8–1.2)
LYMPHOCYTES # BLD AUTO: 1.1 K/UL (ref 1–4.8)
LYMPHOCYTES NFR BLD: 27.6 % (ref 18–48)
MCH RBC QN AUTO: 30.5 PG (ref 27–31)
MCHC RBC AUTO-ENTMCNC: 30.6 G/DL (ref 32–36)
MCV RBC AUTO: 100 FL (ref 82–98)
MONOCYTES # BLD AUTO: 0.6 K/UL (ref 0.3–1)
MONOCYTES NFR BLD: 15.3 % (ref 4–15)
NEUTROPHILS # BLD AUTO: 2 K/UL (ref 1.8–7.7)
NEUTROPHILS NFR BLD: 50.1 % (ref 38–73)
NRBC BLD-RTO: 0 /100 WBC
PLATELET # BLD AUTO: 175 K/UL (ref 150–350)
PMV BLD AUTO: 8.6 FL (ref 9.2–12.9)
POTASSIUM SERPL-SCNC: 4 MMOL/L (ref 3.5–5.1)
PROT SERPL-MCNC: 6.4 G/DL (ref 6–8.4)
PROTHROMBIN TIME: 14.4 SEC (ref 9–12.5)
RBC # BLD AUTO: 3.11 M/UL (ref 4–5.4)
SODIUM SERPL-SCNC: 138 MMOL/L (ref 136–145)
WBC # BLD AUTO: 3.92 K/UL (ref 3.9–12.7)

## 2019-10-29 PROCEDURE — 3008F PR BODY MASS INDEX (BMI) DOCUMENTED: ICD-10-PCS | Mod: CPTII,NTX,S$GLB, | Performed by: INTERNAL MEDICINE

## 2019-10-29 PROCEDURE — 99999 PR PBB SHADOW E&M-EST. PATIENT-LVL III: CPT | Mod: PBBFAC,TXP,, | Performed by: INTERNAL MEDICINE

## 2019-10-29 PROCEDURE — 80053 COMPREHEN METABOLIC PANEL: CPT | Mod: NTX

## 2019-10-29 PROCEDURE — 99215 PR OFFICE/OUTPT VISIT, EST, LEVL V, 40-54 MIN: ICD-10-PCS | Mod: NTX,S$GLB,, | Performed by: INTERNAL MEDICINE

## 2019-10-29 PROCEDURE — 3008F BODY MASS INDEX DOCD: CPT | Mod: CPTII,NTX,S$GLB, | Performed by: INTERNAL MEDICINE

## 2019-10-29 PROCEDURE — 80321 ALCOHOLS BIOMARKERS 1OR 2: CPT | Mod: TXP

## 2019-10-29 PROCEDURE — 99999 PR PBB SHADOW E&M-EST. PATIENT-LVL III: ICD-10-PCS | Mod: PBBFAC,TXP,, | Performed by: INTERNAL MEDICINE

## 2019-10-29 PROCEDURE — 85025 COMPLETE CBC W/AUTO DIFF WBC: CPT | Mod: NTX

## 2019-10-29 PROCEDURE — 99215 OFFICE O/P EST HI 40 MIN: CPT | Mod: NTX,S$GLB,, | Performed by: INTERNAL MEDICINE

## 2019-10-29 PROCEDURE — 85610 PROTHROMBIN TIME: CPT | Mod: TXP

## 2019-10-29 PROCEDURE — 36415 COLL VENOUS BLD VENIPUNCTURE: CPT | Mod: NTX

## 2019-10-29 NOTE — Clinical Note
Patient can be declined for txp given improvement of MELD.  4 month f/u in hepatology with fibroscan for same day.Thanks,CB

## 2019-10-29 NOTE — PROGRESS NOTES
Transplant Hepatology  Liver Transplant Recipient Evaluation      Consultation started: 10/29/2019 at 11:09 AM     Original Referring Provider: Boni Terrazas  Current Corresponding Physician: Boni LOZANO Native Liver Diagnosis: Alcoholic Cirrhosis    Reason for Visit: evaluation for liver transplant     Subjective:     Porsha Underwood is a 53 y.o. 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  Accompanied by .     Previously seen on 8/18/2019.  The patient reports clinical improvement.      Initial history:  The patient reports no known history of chronic liver disease and family confirms.  She has not regularly followed with a primary care provider or had recent PE/lab work.  States that in July 2019 she noted jaundice and abdominal distention which prompted her to see a physician.  She was told that she had liver failure and was hospitalized.  Reports HE with AMS which improved with lactulose administration.  Also placed on rifaximin.  Reports that mental status improved to baseline.  There was no evidence of alcohol withdrawal and the patient reports that she has never had withdrawal symptoms.  Liver disease was complicated by ascites, HE and jaundice.  She did not experience GI bleeding.  Reports 8 day hospitalization.    Discharged on acamprosate and rifaximin which she continued until first office visit.  Reports improving symptoms with no further evidence of HE and volume improving without diuretic therapy although she is not following a low sodium diet.  She has had no alcohol intake since discharge.    The patient reports a history of drinking approximately one pint of bourbon or vodka most days of the week.  She denies any prior health, legal or relationship issues related to alcohol consumption.  She stopped drinking in June 2019, prior to hospitalization but reports no particular precipitant for cessation.  She has returned to work as a .      Interval history:   The patient remains abstinent of alcohol.  Cleared for transplant evaluation by addiction psychiatry if required.  Clinically she is doing well without obvious signs of portal hypertension.  Continuing to take potassium for hypokalemia found on labs with initial visit and rifaximin.  She has had no issues of HE since initial hospitalization.  Mild LE edema at the end of the work day which resolves upon waking up after legs are elevated.  She is following a low sodium diet and remains off of diuretic therapy.      PMH:  Alcoholic liver disease     PSH:    Hysterectomy - 10 years ago for benign etiology    FH: mother with cirrhosis (alcohol)    SH:    and lives with  and daughter  5 children between marriage  Works as a , patient has returned to work  Smoke 1/2ppd  Alcohol - bourbon or vodka (1 pint most days of the week) - last drink June 2019  No illicit drugs     Review of Systems   Constitutional: Positive for fatigue. Negative for activity change, appetite change, chills, fever and unexpected weight change.   HENT: Negative for hearing loss, rhinorrhea and trouble swallowing.    Eyes: Negative for visual disturbance.   Respiratory: Negative for shortness of breath.    Cardiovascular: Negative for chest pain.   Gastrointestinal: Negative for abdominal distention, abdominal pain (mild), nausea and vomiting.   Endocrine: Negative for cold intolerance and heat intolerance.   Musculoskeletal: Negative for gait problem.   Skin: Negative for rash.   Neurological: Negative for weakness and headaches.   Hematological: Negative for adenopathy. Does not bruise/bleed easily.   Psychiatric/Behavioral: Negative for confusion and decreased concentration.       Objective:   Physical Exam   Constitutional: She is oriented to person, place, and time. She appears well-developed and well-nourished. No distress.   jaundice   HENT:   Head: Normocephalic and atraumatic.   Mouth/Throat: Oropharynx is clear  and moist. No oropharyngeal exudate.   Eyes: Pupils are equal, round, and reactive to light. EOM are normal. Scleral icterus is present.   Neck: Normal range of motion. Neck supple. No thyromegaly present.   Cardiovascular: Normal rate, regular rhythm and normal heart sounds. Exam reveals no gallop and no friction rub.   No murmur heard.  Pulmonary/Chest: Effort normal. No respiratory distress. She has no wheezes. She has no rales.   Abdominal: Soft. Bowel sounds are normal. She exhibits distension. There is no tenderness.   Musculoskeletal: Normal range of motion. She exhibits no edema.   Lymphadenopathy:     She has no cervical adenopathy.   Neurological: She is alert and oriented to person, place, and time. No cranial nerve deficit.   Mental status normal, no asterixis   Skin: Skin is warm and dry. No rash noted.   Psychiatric: She has a normal mood and affect. Her behavior is normal.   Vitals reviewed.     MELD-Na score: 15 at 10/29/2019  9:40 AM  MELD score: 15 at 10/29/2019  9:40 AM  Calculated from:  Serum Creatinine: 0.7 mg/dL (Rounded to 1 mg/dL) at 10/29/2019  9:40 AM  Serum Sodium: 138 mmol/L (Rounded to 137 mmol/L) at 10/29/2019  9:40 AM  Total Bilirubin: 2.6 mg/dL at 10/29/2019  9:40 AM  INR(ratio): 1.5 at 10/29/2019  9:40 AM  Age: 53 years     Lab Results   Component Value Date    GLU 77 10/29/2019    BUN 7 10/29/2019    CREATININE 0.7 10/29/2019    CALCIUM 8.8 10/29/2019     10/29/2019    K 4.0 10/29/2019     10/29/2019    PROT 6.4 10/29/2019    CO2 25 10/29/2019    WBC 3.92 10/29/2019    RBC 3.11 (L) 10/29/2019    HGB 9.5 (L) 10/29/2019    HCT 31.0 (L) 10/29/2019     10/29/2019     Lab Results   Component Value Date    ALBUMIN 2.9 (L) 10/29/2019    BILITOT 2.6 (H) 10/29/2019    AST 38 10/29/2019    ALT 16 10/29/2019    ALKPHOS 200 (H) 10/29/2019    LABPROT 14.4 (H) 10/29/2019    INR 1.5 (H) 10/29/2019       Diagnostics: EMR and external labs reviewed    Transplant Hepatology -  Candidacy   Assessment/Plan:     Transplant Candidacy: Porsha Underwood is a 53 y.o. female with ESLD secondary to alcohol abuse here for evaluation for possible OLT. Due to improving MELD score and lack of clinical symptoms, she does not require liver transplant evaluation.      The patient had recent presentation with alcoholic hepatitis which appears to be improving.  MELD score is continuing to improve from 22 down to 15 today.  There is possibility of continued improvement as she only has 4 months of sobriety.  I do not recommend transplant evaluation at this time.      If the patient has interval decompensation despite alcohol abstinence, would proceed with full evaluation given lack of history of chronic liver disease and expression of commitment for lifelong abstinence with good social support.  Addiction psychiatry has also given approval.      HE: may have have trial off of rifaximin.  No lactulose due to intolerance with job as     Hypokalemia:  Continue daily potassium supplementation.  K 4.0 today.      RTC in 4 months in hepatology with fibroscan for same day     Miguelina Salas MD         [facility] Patient Status  Functional Status: 80% - Normal activity with effort: some symptoms of disease  Physical Capacity: No Limitations    Outside Records Request: none

## 2019-10-29 NOTE — PATIENT INSTRUCTIONS
Your liver tests continue to improve.  Your MELD score is 15.    You may discontinue rifaximin.      Continue potassium at this time.    It is important to remain completely free from alcohol.    You do not require liver transplant evaluation.    Return to clinic in 4 months with fibroscan for same day.

## 2019-10-29 NOTE — LETTER
October 29, 2019        Boni Terrazas  [address]  [place] [address]  Phone: [phone]  Fax: [phone]             [facility]  [address]  [facility] [address]  Phone: [phone]   Patient: Porsha Underwood   MR Number: [id number]   YOB: 1965   Date of Visit: 10/29/2019       Dear Dr. Boni Terrazas    Thank you for referring Porsha Underwood to me for evaluation. Attached you will find relevant portions of my assessment and plan of care.    If you have questions, please do not hesitate to call me. I look forward to following Porsha Underwood along with you.    Sincerely,    Miguelina Salas MD    Enclosure    If you would like to receive this communication electronically, please contact [email or web address] or [phone] to request [email or web address] access.    [email or web address] is a tool which provides read-only access to select patient information with whom you have a relationship. Its easy to use and provides real time access to review your patients record including encounter summaries, notes, results, and demographic information.    If you feel you have received this communication in error or would no longer like to receive these types of communications, please e-mail [email or web address]

## 2019-11-04 ENCOUNTER — DOCUMENTATION ONLY (OUTPATIENT)
Dept: TRANSPLANT | Facility: CLINIC | Age: 54
End: 2019-11-04

## 2019-11-04 DIAGNOSIS — Z76.82 ORGAN TRANSPLANT CANDIDATE: ICD-10-CM

## 2019-11-04 DIAGNOSIS — K70.30 ALCOHOLIC CIRRHOSIS OF LIVER WITHOUT ASCITES: Primary | ICD-10-CM

## 2019-11-04 NOTE — PROGRESS NOTES
Pt's transplant episode closed per Dr. Salas. Pt MELD improving. Appt card made for Pt to RTC in Hepatology in 4 months with fibroscan.

## 2020-01-15 DIAGNOSIS — K70.30 ALCOHOLIC CIRRHOSIS OF LIVER WITHOUT ASCITES: Primary | ICD-10-CM

## 2020-03-17 ENCOUNTER — TELEPHONE (OUTPATIENT)
Dept: HEPATOLOGY | Facility: CLINIC | Age: 55
End: 2020-03-17

## 2020-03-17 NOTE — TELEPHONE ENCOUNTER
MA left the following voicemail for the pt...    We are asking our patients to travel and attend appointments at their own risk. If you are sick though (cough, cold, or fever), came in contact with someone who has COVID-19 or have left the country in the last 30 days, then we ask that you do not come to the hospital at this time. But if you are feeling fine, it would be good to come get your check up so we can monitor you, but we also understand that patients are nervous right now to be here, so we have no problem rescheduling you if that's what you choose.

## 2020-03-17 NOTE — TELEPHONE ENCOUNTER
----- Message from Lia Michael MA sent at 3/16/2020  4:44 PM CDT -----  Contact: pt: [phone]      ----- Message -----  From: Mehnaz Abreu  Sent: 3/16/2020   9:34 AM CDT  To: Kira Malik Staff    Pt has concerns re COVID-19, would like to know if 3/19 appt should be kept       Please contact pt: [phone]

## 2020-03-18 ENCOUNTER — TELEPHONE (OUTPATIENT)
Dept: HEPATOLOGY | Facility: CLINIC | Age: 55
End: 2020-03-18

## 2020-03-18 NOTE — TELEPHONE ENCOUNTER
Ma called patient convert her follow up visit to a VIDEO VISIT. Walk her thru how to sign up on her Mychart.     She will do her labs at [place]     P# [phone]  LAB FAX# [phone]    FAX LAB ORDER (PETH, CBC, CMP AND INR)    Patient will go to the lab this afternoon 3/18/20 so we can get the results by the time she log in tomorrow for her appt 3/19.

## 2020-03-19 ENCOUNTER — OFFICE VISIT (OUTPATIENT)
Dept: HEPATOLOGY | Facility: CLINIC | Age: 55
End: 2020-03-19
Payer: COMMERCIAL

## 2020-03-19 DIAGNOSIS — K70.30 ALCOHOLIC CIRRHOSIS OF LIVER WITHOUT ASCITES: Primary | ICD-10-CM

## 2020-03-19 PROCEDURE — 99213 PR OFFICE/OUTPT VISIT, EST, LEVL III, 20-29 MIN: ICD-10-PCS | Mod: 95,,, | Performed by: INTERNAL MEDICINE

## 2020-03-19 PROCEDURE — 99213 OFFICE O/P EST LOW 20 MIN: CPT | Mod: 95,,, | Performed by: INTERNAL MEDICINE

## 2020-03-19 NOTE — PROGRESS NOTES
Subjective:       Patient ID: Porsha Underwood is a 54 y.o. female.    Chief Complaint: No chief complaint on file.    VIDEO VISIT    The patient location is: Home  The chief complaint leading to consultation is: Cirrhosis  Visit type: Virtual visit with synchronous audio and video  Total time spent with patient: 20 min  Each patient to whom he or she provides medical services by telemedicine is:  (1) informed of the relationship between the physician and patient and the respective role of any other health care provider with respect to management of the patient; and (2) notified that he or she may decline to receive medical services by telemedicine and may withdraw from such care at any time.      HPI  I saw this 54 y.o. 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 She was alone.     Previously seen in Oct 2019 by Dr Salas.  The patient reports clinical improvement.       - July 2019 she noted jaundice and abdominal distention which prompted her to see a physician.  She was told that she had liver failure and was hospitalized.  Reports HE with AMS which improved with lactulose administration.  Also placed on rifaximin.    - ascites, HE and jaundice.  She did not experience GI bleeding.  Reports 8 day hospitalization.     Discharged on acamprosate and rifaximin which she continued until first office visit.  Reports improving symptoms with no further evidence of HE and volume improving without diuretic therapy although she is not following a low sodium diet.  She has had no alcohol intake since discharge.     Drinking hx:  one pint of bourbon or vodka most days of the week.  She denies any prior health, legal or relationship issues related to alcohol consumption.  She stopped drinking in June 2019, prior to hospitalization but reports no particular precipitant for cessation.  She has returned to work as a .       Interval history:  The patient remains abstinent of alcohol.       PMH:  Alcoholic liver disease   PSH:    Hysterectomy - 10 years ago for benign etiology     FH: mother with cirrhosis (alcohol)     SH:    and lives with  and daughter  5 children  Works as a , patient has returned to work  Smoke 1/2ppd  Alcohol - bourbon or vodka (1 pint most days of the week) - last drink June 2019  No illicit drugs     Review of Systems   Constitutional: Negative for activity change, appetite change, chills, fatigue, fever and unexpected weight change.   HENT: Negative for ear pain, hearing loss, nosebleeds, sore throat and trouble swallowing.    Eyes: Negative for redness and visual disturbance.   Respiratory: Negative for cough, chest tightness, shortness of breath and wheezing.    Cardiovascular: Negative for chest pain and palpitations.   Gastrointestinal: Negative for abdominal distention, abdominal pain, blood in stool, constipation, diarrhea, nausea and vomiting.   Genitourinary: Negative for difficulty urinating, dysuria, frequency, hematuria and urgency.   Musculoskeletal: Negative for arthralgias, back pain, gait problem, joint swelling and myalgias.   Skin: Negative for rash.   Neurological: Negative for tremors, seizures, speech difficulty, weakness and headaches.   Hematological: Negative for adenopathy.   Psychiatric/Behavioral: Negative for confusion, decreased concentration and sleep disturbance. The patient is not nervous/anxious.            Lab Results   Component Value Date    ALT 16 10/29/2019    AST 38 10/29/2019    GGT 64 (H) 08/08/2019    ALKPHOS 200 (H) 10/29/2019    BILITOT 2.6 (H) 10/29/2019     No past medical history on file.  No past surgical history on file.  Current Outpatient Medications   Medication Sig    potassium chloride SA (K-DUR,KLOR-CON) 20 MEQ tablet Take 1 tablet (20 mEq total) by mouth once daily.    rifAXIMin (XIFAXAN) 550 mg Tab Take 550 mg by mouth 2 (two) times daily.     No current facility-administered medications  for this visit.        Objective:      Physical Exam    NOT DONE_ VIDEO VISIT    Assessment:       1. Alcoholic cirrhosis of liver without ascites        Plan:   Although her MELD score in Oct 2019 was 15, she has since recovered significantly.    - no jaundice  - No edema  - No ascites  She reports having had 2 beers last week and I cautioned her to remain abstinent.    Labs at [facility]  - we will arrange local labs and liver US and if these are stable I will see again in 6 months.

## 2020-03-20 PROBLEM — Z76.82 ORGAN TRANSPLANT CANDIDATE: Status: RESOLVED | Noted: 2019-08-09 | Resolved: 2020-03-20

## 2020-04-07 LAB — PHOSPHATIDYLETHANOL (PETH): NEGATIVE NG/ML

## 2021-01-26 ENCOUNTER — TELEPHONE (OUTPATIENT)
Dept: HEPATOLOGY | Facility: CLINIC | Age: 56
End: 2021-01-26

## 2021-02-22 ENCOUNTER — OFFICE VISIT (OUTPATIENT)
Dept: HEPATOLOGY | Facility: CLINIC | Age: 56
End: 2021-02-22
Payer: COMMERCIAL

## 2021-02-22 ENCOUNTER — TELEPHONE (OUTPATIENT)
Dept: HEPATOLOGY | Facility: CLINIC | Age: 56
End: 2021-02-22

## 2021-02-22 DIAGNOSIS — K70.30 ALCOHOLIC CIRRHOSIS OF LIVER WITHOUT ASCITES: Primary | ICD-10-CM

## 2021-02-22 PROCEDURE — 99213 OFFICE O/P EST LOW 20 MIN: CPT | Mod: 95,,, | Performed by: INTERNAL MEDICINE

## 2021-02-22 PROCEDURE — 99213 PR OFFICE/OUTPT VISIT, EST, LEVL III, 20-29 MIN: ICD-10-PCS | Mod: 95,,, | Performed by: INTERNAL MEDICINE

## 2021-02-23 ENCOUNTER — TELEPHONE (OUTPATIENT)
Dept: HEPATOLOGY | Facility: CLINIC | Age: 56
End: 2021-02-23

## 2021-09-09 DIAGNOSIS — K70.30 ALCOHOLIC CIRRHOSIS OF LIVER WITHOUT ASCITES: Primary | ICD-10-CM

## 2021-09-10 ENCOUNTER — TELEPHONE (OUTPATIENT)
Dept: HEPATOLOGY | Facility: CLINIC | Age: 56
End: 2021-09-10

## 2021-09-14 ENCOUNTER — TELEPHONE (OUTPATIENT)
Dept: HEPATOLOGY | Facility: CLINIC | Age: 56
End: 2021-09-14

## 2022-08-02 ENCOUNTER — TELEPHONE (OUTPATIENT)
Dept: HEPATOLOGY | Facility: CLINIC | Age: 57
End: 2022-08-02
Payer: COMMERCIAL

## 2022-08-02 DIAGNOSIS — K70.30 ALCOHOLIC CIRRHOSIS OF LIVER WITHOUT ASCITES: Primary | ICD-10-CM

## 2022-08-02 NOTE — TELEPHONE ENCOUNTER
----- Message from Tereza Cordero, SHAYLA sent at 8/2/2022  1:52 PM CDT -----  Regarding: FW: schedule appts  Dieudonne Junior,  All Orders entered for her Follow Up, Labs and US.    Thanks Again    ----- Message -----  From: Vernon Acuña MA  Sent: 8/2/2022  11:01 AM CDT  To: King Malone MD, Tereza Cordero RN  Subject: FW: schedule appts                               Hey what testing will pt need prior to a f/u   ----- Message -----  From: Malcolm Elias  Sent: 8/2/2022  10:47 AM CDT  To: [facility] Hepat Clinical Staff  Subject: schedule appts                                   Patient calling to schedule appts preferably on 8/22/2022 along with US.    Call: [phone] ([employer]

## 2022-10-12 ENCOUNTER — HOSPITAL ENCOUNTER (OUTPATIENT)
Dept: RADIOLOGY | Facility: HOSPITAL | Age: 57
Discharge: HOME | End: 2022-10-12
Attending: INTERNAL MEDICINE
Payer: COMMERCIAL

## 2022-10-12 DIAGNOSIS — K70.30 ALCOHOLIC CIRRHOSIS OF LIVER WITHOUT ASCITES: ICD-10-CM

## 2022-10-12 PROCEDURE — 76705 ECHO EXAM OF ABDOMEN: CPT | Mod: TC

## 2022-10-12 PROCEDURE — 76705 US ABDOMEN LIMITED_LIVER: ICD-10-PCS | Mod: 26,,, | Performed by: RADIOLOGY

## 2022-10-12 PROCEDURE — 76705 ECHO EXAM OF ABDOMEN: CPT | Mod: 26,,, | Performed by: RADIOLOGY

## 2022-10-13 ENCOUNTER — TELEPHONE (OUTPATIENT)
Dept: HEPATOLOGY | Facility: CLINIC | Age: 57
End: 2022-10-13

## 2022-10-13 ENCOUNTER — OFFICE VISIT (OUTPATIENT)
Dept: HEPATOLOGY | Facility: CLINIC | Age: 57
End: 2022-10-13
Payer: COMMERCIAL

## 2022-10-13 DIAGNOSIS — K70.30 ALCOHOLIC CIRRHOSIS OF LIVER WITHOUT ASCITES: Primary | ICD-10-CM

## 2022-10-13 PROCEDURE — 99213 PR OFFICE/OUTPT VISIT, EST, LEVL III, 20-29 MIN: ICD-10-PCS | Mod: 95,,, | Performed by: INTERNAL MEDICINE

## 2022-10-13 PROCEDURE — 99213 OFFICE O/P EST LOW 20 MIN: CPT | Mod: 95,,, | Performed by: INTERNAL MEDICINE

## 2022-10-13 NOTE — PROGRESS NOTES
Subjective:       Patient ID: Porsha Underwood is a 56 y.o. female.    Chief Complaint: No chief complaint on file.    VIDEO VISIT    The patient location is: Car  The chief complaint leading to consultation is: Cirrhosis  Visit type: Virtual visit with synchronous audio and video  Total time spent with patient: 20 min  Each patient to whom he or she provides medical services by telemedicine is:  (1) informed of the relationship between the physician and patient and the respective role of any other health care provider with respect to management of the patient; and (2) notified that he or she may decline to receive medical services by telemedicine and may withdraw from such care at any time.      HPI  I saw this 56 y.o. 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 Her last appointment with me was in March 2021.       The patient remains well and has not had any hospital admissions    - remains abstinent  - no reports of jaundice or fluid retention       - July 2019 she noted jaundice and abdominal distention which prompted her to see a physician.  She was told that she had liver failure and was hospitalized.  Reports HE with AMS which improved with lactulose administration.  Also placed on rifaximin.    - ascites, HE and jaundice.  She did not experience GI bleeding.  Reports 8 day hospitalization.     Discharged on acamprosate and rifaximin which she continued until first office visit.  Reports improving symptoms with no further evidence of HE and volume improving without diuretic therapy although she is not following a low sodium diet. She has had no alcohol intake since discharge.    On no medications at the moment.    Current labs on 10/12/22  INR 1.3  Platelets 201  Larry 1.8    AFP 2.8    Abdo US: 10/12/22  Diffuse increased echogenicity of the liver suggestive of hepatic steatosis.  No ascites  Splenomegaly and patent umbilical vein suggestive of portal venous hypertension        MELD-Na score: 12 at 10/12/2022 12:00 PM  MELD score: 12 at 10/12/2022 12:00 PM  Calculated from:  Serum Creatinine: 0.7 mg/dL (Using min of 1 mg/dL) at 10/12/2022 12:00 PM  Serum Sodium: 146 mmol/L (Using max of 137 mmol/L) at 10/12/2022 12:00 PM  Total Bilirubin: 1.8 mg/dL at 10/12/2022 12:00 PM  INR(ratio): 1.3 at 10/12/2022 12:00 PM  Age: 56 years    Drinking hx:  one pint of bourbon or vodka most days of the week.  She denies any prior health, legal or relationship issues related to alcohol consumption.  She stopped drinking in June 2019, prior to hospitalization but reports no particular precipitant for cessation.  She has returned to work as a .       Interval history:  The patient remains abstinent of alcohol.      PMH:  Alcoholic liver disease   PSH:    Hysterectomy - 10 years ago for benign etiology     FH: mother with cirrhosis (alcohol)     SH:    and lives with  and daughter  5 children  Works as a , patient has returned to work  Smoke 1/2ppd  Alcohol - bourbon or vodka (1 pint most days of the week) - last drink June 2019  No illicit drugs     Review of Systems   Constitutional:  Negative for activity change, appetite change, chills, fatigue, fever and unexpected weight change.   HENT:  Negative for ear pain, hearing loss, nosebleeds, sore throat and trouble swallowing.    Eyes:  Negative for redness and visual disturbance.   Respiratory:  Negative for cough, chest tightness, shortness of breath and wheezing.    Cardiovascular:  Negative for chest pain and palpitations.   Gastrointestinal:  Negative for abdominal distention, abdominal pain, blood in stool, constipation, diarrhea, nausea and vomiting.   Genitourinary:  Negative for difficulty urinating, dysuria, frequency, hematuria and urgency.   Musculoskeletal:  Negative for arthralgias, back pain, gait problem, joint swelling and myalgias.   Skin:  Negative for rash.   Neurological:  Negative for tremors,  seizures, speech difficulty, weakness and headaches.   Hematological:  Negative for adenopathy.   Psychiatric/Behavioral:  Negative for confusion, decreased concentration and sleep disturbance. The patient is not nervous/anxious.          Lab Results   Component Value Date    ALT 29 10/12/2022    AST 37 10/12/2022    GGT 64 (H) 08/08/2019    ALKPHOS 124 10/12/2022    BILITOT 1.8 (H) 10/12/2022     No past medical history on file.  No past surgical history on file.  Current Outpatient Medications   Medication Sig    potassium chloride SA (K-DUR,KLOR-CON) 20 MEQ tablet Take 1 tablet (20 mEq total) by mouth once daily.    rifAXIMin (XIFAXAN) 550 mg Tab Take 550 mg by mouth 2 (two) times daily.     No current facility-administered medications for this visit.       Objective:      Physical Exam    NOT DONE_ VIDEO VISIT    Assessment:       1. Alcoholic cirrhosis of liver without ascites          Plan:   She has recovered completely from her episode of liver decompensation. It is not clear at this point whether she has cirrhosis or whether she had an episode of alcoholic hepatitis.    - no jaundice  - No edema  - No ascites        - US report shows no ascites or liver mass  - labs contiue to improve    Labs/US in 6 months  Rreassured  COntiues to maintain abstinence.

## 2022-10-13 NOTE — TELEPHONE ENCOUNTER
----- Message from King Malone MD sent at 10/13/2022  9:09 AM CDT -----  Clinic in 6 months with labs/US- video visit

## 2023-10-05 ENCOUNTER — TELEPHONE (OUTPATIENT)
Dept: HEPATOLOGY | Facility: CLINIC | Age: 58
End: 2023-10-05
Payer: COMMERCIAL

## 2023-10-05 DIAGNOSIS — K70.30 ALCOHOLIC CIRRHOSIS OF LIVER WITHOUT ASCITES: Primary | ICD-10-CM

## 2023-10-05 NOTE — TELEPHONE ENCOUNTER
"----- Message from Tereza Cordero RN sent at 10/5/2023 10:00 AM CDT -----  Dieudonne Junior,    All new orders are in.  Thanks    ----- Message -----  From: Vernon Acuña MA  Sent: 10/5/2023   9:09 AM CDT  To: Tereza Cordero RN    Can you please place up to date labs and us orders. Thanks !  ----- Message -----  From: Juana Stevens MA  Sent: 10/4/2023  10:26 AM CDT  To: Vernon Acuña MA      ----- Message -----  From: Raman Gleason  Sent: 10/4/2023  10:11 AM CDT  To: Kira Malik Staff    Scheduling Request        Patient Status: Est      Scheduling Appt: Annual (F/u, NFL, US)      Time/Date Preference: After 10/12      Contact Preference?: [phone] (home)       Treating Provider: Kira      Additional Notes:  "Thank you for all that you do for our patients"            "

## 2023-11-13 ENCOUNTER — HOSPITAL ENCOUNTER (OUTPATIENT)
Dept: RADIOLOGY | Facility: HOSPITAL | Age: 58
Discharge: HOME | End: 2023-11-13
Attending: INTERNAL MEDICINE
Payer: COMMERCIAL

## 2023-11-13 DIAGNOSIS — K70.30 ALCOHOLIC CIRRHOSIS OF LIVER WITHOUT ASCITES: ICD-10-CM

## 2023-11-13 PROCEDURE — 76705 ECHO EXAM OF ABDOMEN: CPT | Mod: 26,,, | Performed by: RADIOLOGY

## 2023-11-13 PROCEDURE — 76705 US ABDOMEN LIMITED_LIVER: ICD-10-PCS | Mod: 26,,, | Performed by: RADIOLOGY

## 2023-11-13 PROCEDURE — 76705 ECHO EXAM OF ABDOMEN: CPT | Mod: TC

## 2023-11-29 ENCOUNTER — OFFICE VISIT (OUTPATIENT)
Dept: HEPATOLOGY | Facility: CLINIC | Age: 58
End: 2023-11-29
Payer: COMMERCIAL

## 2023-11-29 DIAGNOSIS — K70.30 ALCOHOLIC CIRRHOSIS OF LIVER WITHOUT ASCITES: Primary | ICD-10-CM

## 2023-11-29 PROCEDURE — 99214 OFFICE O/P EST MOD 30 MIN: CPT | Mod: 95,,, | Performed by: INTERNAL MEDICINE

## 2023-11-29 PROCEDURE — 99214 PR OFFICE/OUTPT VISIT, EST, LEVL IV, 30-39 MIN: ICD-10-PCS | Mod: 95,,, | Performed by: INTERNAL MEDICINE

## 2023-11-29 NOTE — PROGRESS NOTES
Subjective:       Patient ID: Porsha Underwood is a 58 y.o. female.    Chief Complaint: No chief complaint on file.      VIDEO VISIT    The patient location is: Home  The chief complaint leading to consultation is: Cirrhosis  Visit type: Virtual visit with synchronous audio and video  Total time spent with patient: 20 min  Each patient to whom he or she provides medical services by telemedicine is:  (1) informed of the relationship between the physician and patient and the respective role of any other health care provider with respect to management of the patient; and (2) notified that he or she may decline to receive medical services by telemedicine and may withdraw from such care at any time.      HPI  I saw this 58 y.o. 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 Her last appointment with me was in Oct 2022.       The patient remains well and has not had any hospital admissions    - remains abstinent  - no reports of jaundice or fluid retention       - July 2019 she noted jaundice and abdominal distention which prompted her to see a physician.  She was told that she had liver failure and was hospitalized.  Reports HE with AMS which improved with lactulose administration.  Also placed on rifaximin.    - ascites, HE and jaundice.  She did not experience GI bleeding.  Reports 8 day hospitalization.     Discharged on acamprosate and rifaximin which she continued until first office visit.  Reports improving symptoms with no further evidence of HE and volume improving without diuretic therapy although she is not following a low sodium diet. She has had no alcohol intake since discharge.    On no medications at the moment.    Current labs on 11/13/2023  INR 1.1  Platelets 208  Larry 1.5    AFP 3.2    Abdo US: 11/13/23  Heterogeneous echotexture of the liver suggestive of cirrhosis.  No focal hepatic lesions.  Recanalization of the umbilical vein suggestive of portal venous  hypertension       MELD 3.0: 10 at 11/13/2023 11:05 AM  MELD-Na: 9 at 11/13/2023 11:05 AM  Calculated from:  Serum Creatinine: 0.8 mg/dL (Using min of 1 mg/dL) at 11/13/2023 11:05 AM  Serum Sodium: 141 mmol/L (Using max of 137 mmol/L) at 11/13/2023 11:05 AM  Total Bilirubin: 1.5 mg/dL at 11/13/2023 11:05 AM  Serum Albumin: 4.1 g/dL (Using max of 3.5 g/dL) at 11/13/2023 11:05 AM  INR(ratio): 1.1 at 11/13/2023 11:05 AM  Age at listing (hypothetical): 57 years  Sex: Female at 11/13/2023 11:05 AM      Drinking hx:  one pint of bourbon or vodka most days of the week.  She denies any prior health, legal or relationship issues related to alcohol consumption.  She stopped drinking in June 2019, prior to hospitalization but reports no particular precipitant for cessation.  She has returned to work as a .       Interval history:  The patient remains abstinent of alcohol.      PMH:  Alcoholic liver disease   PSH:    Hysterectomy - 10 years ago for benign etiology     FH: mother with cirrhosis (alcohol)     SH:    and lives with  and daughter  5 children  Works as a , patient has returned to work  Smoke 1/2ppd  Alcohol - bourbon or vodka (1 pint most days of the week) - last drink June 2019  No illicit drugs     Review of Systems   Constitutional:  Negative for activity change, appetite change, chills, fatigue, fever and unexpected weight change.   HENT:  Negative for ear pain, hearing loss, nosebleeds, sore throat and trouble swallowing.    Eyes:  Negative for redness and visual disturbance.   Respiratory:  Negative for cough, chest tightness, shortness of breath and wheezing.    Cardiovascular:  Negative for chest pain and palpitations.   Gastrointestinal:  Negative for abdominal distention, abdominal pain, blood in stool, constipation, diarrhea, nausea and vomiting.   Genitourinary:  Negative for difficulty urinating, dysuria, frequency, hematuria and urgency.   Musculoskeletal:   Negative for arthralgias, back pain, gait problem, joint swelling and myalgias.   Skin:  Negative for rash.   Neurological:  Negative for tremors, seizures, speech difficulty, weakness and headaches.   Hematological:  Negative for adenopathy.   Psychiatric/Behavioral:  Negative for confusion, decreased concentration and sleep disturbance. The patient is not nervous/anxious.            Lab Results   Component Value Date    ALT 30 11/13/2023    AST 40 11/13/2023    GGT 64 (H) 08/08/2019    ALKPHOS 103 11/13/2023    BILITOT 1.5 (H) 11/13/2023     No past medical history on file.  No past surgical history on file.  Current Outpatient Medications   Medication Sig    potassium chloride SA (K-DUR,KLOR-CON) 20 MEQ tablet Take 1 tablet (20 mEq total) by mouth once daily.    rifAXIMin (XIFAXAN) 550 mg Tab Take 550 mg by mouth 2 (two) times daily.     No current facility-administered medications for this visit.       Objective:      Physical Exam    NOT DONE_ VIDEO VISIT    Assessment:       1. Alcoholic cirrhosis of liver without ascites          Plan:   She has recovered completely from her episode of liver decompensation  - no jaundice  - No edema  - No ascites      Labs/US in 6 months  Reassured  Contiues to maintain abstinence.

## 2025-07-21 ENCOUNTER — TELEPHONE (OUTPATIENT)
Dept: HEPATOLOGY | Facility: CLINIC | Age: 60
End: 2025-07-21
Payer: COMMERCIAL

## 2025-07-21 DIAGNOSIS — K70.30 ALCOHOLIC CIRRHOSIS OF LIVER WITHOUT ASCITES: Primary | ICD-10-CM

## 2025-07-21 NOTE — TELEPHONE ENCOUNTER
Returned call to pt and scheduled her for an appointment on 9/9/25. Pt stated wants to do US and labs prior to do that and that will do it outside [facility] since she is 3 hours away from [place]. Pt stated to sent the order over to her home address on file on. I stated will ask for orders to be placed.

## 2025-07-21 NOTE — TELEPHONE ENCOUNTER
"Copied from CRM #[id number]. Topic: General Inquiry - Patient Advice  >> Jul 21, 2025 11:30 AM Saji wrote:  Consult/Advisory:        Name Of Caller: Self     Contact Preference?:[phone]     What is the nature of the call?: Calling to speak w/  someone in regards to scheduling appt requesting call back      Additional Notes:  "Thank you for all that you do for our patients"  "

## 2025-08-28 ENCOUNTER — TELEPHONE (OUTPATIENT)
Dept: HEPATOLOGY | Facility: CLINIC | Age: 60
End: 2025-08-28
Payer: COMMERCIAL